# Patient Record
Sex: FEMALE | Race: WHITE | NOT HISPANIC OR LATINO | Employment: FULL TIME | URBAN - NONMETROPOLITAN AREA
[De-identification: names, ages, dates, MRNs, and addresses within clinical notes are randomized per-mention and may not be internally consistent; named-entity substitution may affect disease eponyms.]

---

## 2024-06-05 ENCOUNTER — HOSPITAL ENCOUNTER (EMERGENCY)
Facility: HOSPITAL | Age: 29
Discharge: HOME | End: 2024-06-05
Attending: EMERGENCY MEDICINE
Payer: COMMERCIAL

## 2024-06-05 ENCOUNTER — HOSPITAL ENCOUNTER (OUTPATIENT)
Dept: CARDIOLOGY | Facility: HOSPITAL | Age: 29
Discharge: HOME | End: 2024-06-05
Payer: COMMERCIAL

## 2024-06-05 ENCOUNTER — APPOINTMENT (OUTPATIENT)
Dept: RADIOLOGY | Facility: HOSPITAL | Age: 29
End: 2024-06-05
Payer: COMMERCIAL

## 2024-06-05 VITALS
OXYGEN SATURATION: 98 % | RESPIRATION RATE: 15 BRPM | BODY MASS INDEX: 22.58 KG/M2 | DIASTOLIC BLOOD PRESSURE: 78 MMHG | WEIGHT: 115 LBS | HEART RATE: 85 BPM | HEIGHT: 60 IN | TEMPERATURE: 98.3 F | SYSTOLIC BLOOD PRESSURE: 127 MMHG

## 2024-06-05 DIAGNOSIS — R07.9 CHEST PAIN, UNSPECIFIED TYPE: Primary | ICD-10-CM

## 2024-06-05 DIAGNOSIS — R06.00 DYSPNEA, UNSPECIFIED TYPE: ICD-10-CM

## 2024-06-05 LAB
ALBUMIN SERPL BCP-MCNC: 4.9 G/DL (ref 3.4–5)
ALP SERPL-CCNC: 56 U/L (ref 33–110)
ALT SERPL W P-5'-P-CCNC: 9 U/L (ref 7–45)
ANION GAP SERPL CALC-SCNC: 12 MMOL/L (ref 10–20)
AST SERPL W P-5'-P-CCNC: 12 U/L (ref 9–39)
ATRIAL RATE: 90 BPM
BASOPHILS # BLD AUTO: 0.02 X10*3/UL (ref 0–0.1)
BASOPHILS NFR BLD AUTO: 0.4 %
BILIRUB SERPL-MCNC: 0.5 MG/DL (ref 0–1.2)
BUN SERPL-MCNC: 14 MG/DL (ref 6–23)
CALCIUM SERPL-MCNC: 9.7 MG/DL (ref 8.6–10.3)
CARDIAC TROPONIN I PNL SERPL HS: <3 NG/L (ref 0–13)
CARDIAC TROPONIN I PNL SERPL HS: <3 NG/L (ref 0–13)
CHLORIDE SERPL-SCNC: 106 MMOL/L (ref 98–107)
CO2 SERPL-SCNC: 28 MMOL/L (ref 21–32)
CREAT SERPL-MCNC: 0.63 MG/DL (ref 0.5–1.05)
D DIMER PPP FEU-MCNC: <215 NG/ML FEU
EGFRCR SERPLBLD CKD-EPI 2021: >90 ML/MIN/1.73M*2
EOSINOPHIL # BLD AUTO: 0.02 X10*3/UL (ref 0–0.7)
EOSINOPHIL NFR BLD AUTO: 0.4 %
ERYTHROCYTE [DISTWIDTH] IN BLOOD BY AUTOMATED COUNT: 11.8 % (ref 11.5–14.5)
GLUCOSE SERPL-MCNC: 88 MG/DL (ref 74–99)
HCT VFR BLD AUTO: 40.9 % (ref 36–46)
HGB BLD-MCNC: 13.3 G/DL (ref 12–16)
IMM GRANULOCYTES # BLD AUTO: 0.02 X10*3/UL (ref 0–0.7)
IMM GRANULOCYTES NFR BLD AUTO: 0.4 % (ref 0–0.9)
LYMPHOCYTES # BLD AUTO: 0.88 X10*3/UL (ref 1.2–4.8)
LYMPHOCYTES NFR BLD AUTO: 15.9 %
MCH RBC QN AUTO: 30.7 PG (ref 26–34)
MCHC RBC AUTO-ENTMCNC: 32.5 G/DL (ref 32–36)
MCV RBC AUTO: 95 FL (ref 80–100)
MONOCYTES # BLD AUTO: 0.57 X10*3/UL (ref 0.1–1)
MONOCYTES NFR BLD AUTO: 10.3 %
NEUTROPHILS # BLD AUTO: 4.03 X10*3/UL (ref 1.2–7.7)
NEUTROPHILS NFR BLD AUTO: 72.6 %
NRBC BLD-RTO: 0 /100 WBCS (ref 0–0)
P AXIS: 64 DEGREES
P OFFSET: 207 MS
P ONSET: 158 MS
PLATELET # BLD AUTO: 162 X10*3/UL (ref 150–450)
POTASSIUM SERPL-SCNC: 3.9 MMOL/L (ref 3.5–5.3)
PR INTERVAL: 126 MS
PROT SERPL-MCNC: 7.9 G/DL (ref 6.4–8.2)
Q ONSET: 221 MS
QRS COUNT: 15 BEATS
QRS DURATION: 76 MS
QT INTERVAL: 342 MS
QTC CALCULATION(BAZETT): 418 MS
QTC FREDERICIA: 391 MS
R AXIS: 68 DEGREES
RBC # BLD AUTO: 4.33 X10*6/UL (ref 4–5.2)
SODIUM SERPL-SCNC: 142 MMOL/L (ref 136–145)
T AXIS: 6 DEGREES
T OFFSET: 392 MS
VENTRICULAR RATE: 90 BPM
WBC # BLD AUTO: 5.5 X10*3/UL (ref 4.4–11.3)

## 2024-06-05 PROCEDURE — 84484 ASSAY OF TROPONIN QUANT: CPT

## 2024-06-05 PROCEDURE — 36415 COLL VENOUS BLD VENIPUNCTURE: CPT

## 2024-06-05 PROCEDURE — 99283 EMERGENCY DEPT VISIT LOW MDM: CPT | Mod: 25

## 2024-06-05 PROCEDURE — 71046 X-RAY EXAM CHEST 2 VIEWS: CPT | Performed by: RADIOLOGY

## 2024-06-05 PROCEDURE — 93005 ELECTROCARDIOGRAM TRACING: CPT

## 2024-06-05 PROCEDURE — 85025 COMPLETE CBC W/AUTO DIFF WBC: CPT

## 2024-06-05 PROCEDURE — 85379 FIBRIN DEGRADATION QUANT: CPT

## 2024-06-05 PROCEDURE — 84075 ASSAY ALKALINE PHOSPHATASE: CPT

## 2024-06-05 PROCEDURE — 71046 X-RAY EXAM CHEST 2 VIEWS: CPT

## 2024-06-05 ASSESSMENT — PAIN DESCRIPTION - ORIENTATION: ORIENTATION: RIGHT

## 2024-06-05 ASSESSMENT — HEART SCORE
TROPONIN: LESS THAN OR EQUAL TO NORMAL LIMIT
ECG: NORMAL
RISK FACTORS: NO KNOWN RISK FACTORS
HISTORY: SLIGHTLY SUSPICIOUS
AGE: <45
HEART SCORE: 0

## 2024-06-05 ASSESSMENT — PAIN SCALES - GENERAL
PAINLEVEL_OUTOF10: 2
PAINLEVEL_OUTOF10: 7

## 2024-06-05 ASSESSMENT — PAIN DESCRIPTION - DESCRIPTORS
DESCRIPTORS: ACHING
DESCRIPTORS: ACHING

## 2024-06-05 ASSESSMENT — COLUMBIA-SUICIDE SEVERITY RATING SCALE - C-SSRS
2. HAVE YOU ACTUALLY HAD ANY THOUGHTS OF KILLING YOURSELF?: NO
1. IN THE PAST MONTH, HAVE YOU WISHED YOU WERE DEAD OR WISHED YOU COULD GO TO SLEEP AND NOT WAKE UP?: NO

## 2024-06-05 ASSESSMENT — PAIN - FUNCTIONAL ASSESSMENT
PAIN_FUNCTIONAL_ASSESSMENT: 0-10
PAIN_FUNCTIONAL_ASSESSMENT: 0-10

## 2024-06-05 ASSESSMENT — PAIN DESCRIPTION - LOCATION: LOCATION: CHEST

## 2024-06-05 NOTE — ED NOTES
Patient to ed after she has had intermittent right sided chest pain and palpitations for aprox 3 days. Patient is on a low dose beta blocker for tachycardia. Patient called cardiology and the office was unable to get her in to be seen and recommended she go to the ed. Patient brought into triage room and EKG done ASAP.     Megan Tavarez RN  06/05/24 0417

## 2024-06-05 NOTE — ED PROVIDER NOTES
Limitations to history: None  Independent Historians: Family  External Records Reviewed: HIE, OARRS, outpatient notes, inpatient notes, paper charts if needed    History of Present Illness:  Patient is a 28-year-old female presents to ED chief complaint of chest pain, palpitations and shortness of breath for the past 3 to 4 days.  Patient also reports some left-sided rib pain.  Reports that her shortness of breath is worse upon inspiration, reports that the chest pain is 6 out of 10 and is intermittent.  Patient denies any recent falls, injury and/or trauma.  Patient denies any fevers, chills, smoking history, recent travel.  Patient reports she does not take birth control.  Reports that she has a past medical history of tachycardia, and she takes metoprolol.  Reports that she called her cardiology office and was unable to be seen today.  Patient is alert and oriented x 3 upon examination and otherwise no apparent distress.      Denies HA, ABD pain, Nausea, Vomiting, Diarrhea, Weakness, Dizziness, Fever, Chills.    PMFSH:   As per HPI, otherwise nurses notes reviewed in EMR    Physical Exam:  Appearance: Alert, oriented x3, supine on exam table with head elevated, cooperative, in no acute distress. Well nourished & well hydrated.      Skin: Intact, dry skin, no lesions, rash, petechiae or purpura.     Eyes: PERRLA, EOMs intact, Conjunctiva pink with no redness or exudates. No scleral icterus.     Ears: Hearing grossly intact.      Nose: Nares patent, no epistaxis.     Mouth: Dentition without concerning abnormalities. no obstruction of posterior pharynx.     Neck: Supple, without meningismus. Trachea at midline.     Pulmonary: Clear bilaterally with good chest wall excursion. No rales, rhonchi or wheezing. No accessory muscle use or stridor. Talking in full sentences.     Cardiac: Normal S1, S2 without murmur, rub, gallop or extrasystole.     Abdomen: Soft, nontender to light and deep palpation to all quadrants,  normoactive bowel sounds.  No palpable organomegaly.  No rebound or guarding.     Genitourinary: Physical exam deferred.     Musculoskeletal: Normal gait. Full range of motion to all extremities. Rest of the exam reveals no pain on palpation, instability, or deformity. Pulses full and equal. No cyanosis or clubbing. capillary refill <2 seconds to all examined digits.     Neurological:  Cranial nerves II through XII are grossly intact, normal sensation, no weakness, no focal findings identified.      Psychiatric: Appropriate mood and affect.    EKG interpreted by me shows   Ventricular rate of 90  bpm  MN interval   126             ms  QTc    342/418                        ms  No T wave elevation or depression    Labs Reviewed   CBC WITH AUTO DIFFERENTIAL - Abnormal       Result Value    WBC 5.5      nRBC 0.0      RBC 4.33      Hemoglobin 13.3      Hematocrit 40.9      MCV 95      MCH 30.7      MCHC 32.5      RDW 11.8      Platelets 162      Neutrophils % 72.6      Immature Granulocytes %, Automated 0.4      Lymphocytes % 15.9      Monocytes % 10.3      Eosinophils % 0.4      Basophils % 0.4      Neutrophils Absolute 4.03      Immature Granulocytes Absolute, Automated 0.02      Lymphocytes Absolute 0.88 (*)     Monocytes Absolute 0.57      Eosinophils Absolute 0.02      Basophils Absolute 0.02     COMPREHENSIVE METABOLIC PANEL - Normal    Glucose 88      Sodium 142      Potassium 3.9      Chloride 106      Bicarbonate 28      Anion Gap 12      Urea Nitrogen 14      Creatinine 0.63      eGFR >90      Calcium 9.7      Albumin 4.9      Alkaline Phosphatase 56      Total Protein 7.9      AST 12      Bilirubin, Total 0.5      ALT 9     D-DIMER, NON VTE - Normal    D-Dimer Non VTE, Quant (ng/mL FEU) <215      Narrative:     The D-Dimer assay is reported in ng/mL Fibrinogen Equivalent Units (FEU). The results of this assay should NOT be used for the exclusion of Deep Vein Thrombosis and/or Pulmonary Embolism.   SERIAL  TROPONIN-INITIAL - Normal    Troponin I, High Sensitivity <3      Narrative:     Less than 99th percentile of normal range cutoff-  Female and children under 18 years old <14 ng/L; Male <21 ng/L: Negative  Repeat testing should be performed if clinically indicated.     Female and children under 18 years old 14-50 ng/L; Male 21-50 ng/L:  Consistent with possible cardiac damage and possible increased clinical   risk. Serial measurements may help to assess extent of myocardial damage.     >50 ng/L: Consistent with cardiac damage, increased clinical risk and  myocardial infarction. Serial measurements may help assess extent of   myocardial damage.      NOTE: Children less than 1 year old may have higher baseline troponin   levels and results should be interpreted in conjunction with the overall   clinical context.     NOTE: Troponin I testing is performed using a different   testing methodology at [facility] than at other   [facility]. Direct result comparisons should only   be made within the same method.   SERIAL TROPONIN, 1 HOUR - Normal    Troponin I, High Sensitivity <3      Narrative:     Less than 99th percentile of normal range cutoff-  Female and children under 18 years old <14 ng/L; Male <21 ng/L: Negative  Repeat testing should be performed if clinically indicated.     Female and children under 18 years old 14-50 ng/L; Male 21-50 ng/L:  Consistent with possible cardiac damage and possible increased clinical   risk. Serial measurements may help to assess extent of myocardial damage.     >50 ng/L: Consistent with cardiac damage, increased clinical risk and  myocardial infarction. Serial measurements may help assess extent of   myocardial damage.      NOTE: Children less than 1 year old may have higher baseline troponin   levels and results should be interpreted in conjunction with the overall   clinical context.     NOTE: Troponin I testing is performed using a different   testing  methodology at [facility] [place] [facility] than at other   [facility]. Direct result comparisons should only   be made within the same method.   TROPONIN SERIES- (INITIAL, 1 HR)    Narrative:     The following orders were created for panel order Troponin Series, (0, 1 HR).  Procedure                               Abnormality         Status                     ---------                               -----------         ------                     Troponin I, High Sensiti...[[id number]]  Normal              Final result               Troponin, High Sensitivi...[[id number]]  Normal              Final result                 Please view results for these tests on the individual orders.      XR chest 2 views   Final Result   No acute pathologic findings are identified on this exam.        MACRO:   none        Signed by: David Lim 6/5/2024 12:07 PM   Dictation workstation:   [id number]             Repeat Evaluation below    Summary:  Medical Decision Making:   Patient presented as described in HPI. Patient case including ROS, PE, and treatment and plan discussed with ED attending if attached as cosigner. Due to patients presentation orders completed include as documented.  Patient evaluated for complaints of chest pain, shortness of breath.  Patient was found to be afebrile, nontachycardic, nonhypoxic.  Lab work was unremarkable.  No evidence of leukocytosis or electrolyte imbalances present.  Delta troponin was negative, D-dimer was also negative.  Patient had a total heart score of 0.  Chest x-ray revealed no acute pathological findings.  Patient remained asymptomatic while in ED, well-appearing.  Patient reports she has a follow-up appointment scheduled with Dr. Cloud on next Wednesday.  Patient is aware to keep her appointment and follow-up.  Patient is aware that if her chest pain worsens, shortness of breath becomes constant she needs to return to ED for further evaluation and treatment, otherwise follow-up  as previously scheduled.  Patient was advised to follow up with PCP or recommended provider in 2-3 days for another evaluation and exam. I advised patient/guardian to return or go to closest emergency room immediately if symptoms change, get worse, new symptoms develop prior to follow up. If there is no improvement in symptoms in the next 24 hours they are advised to return for further evaluation and exam. I also explained the plan and treatment course. Patient/guardian is in agreement with plan, treatment course, and follow up and states verbally that they will comply.    Tests/Medications/Escalations of Care considered but not given:    Patient care discussed with: N/A  Social Determinants affecting care: N/A    Final diagnosis and disposition as documented in impression    Homegoing. I discussed the differential; results and discharge plan with the patient and/or family/friend/caregiver if present.  I emphasized the importance of follow-up with the physician I referred them to in the timeframe recommended.  I explained reasons for the patient to return to the Emergency Department. They agreed that if they feel their condition is worsening or if they have any other concern they should call 911 immediately for further assistance. I gave the patient an opportunity to ask all questions they had and answered all of them accordingly. They understand return precautions and discharge instructions. The patient and/or family/friend/caregiver expressed understanding verbally and that they would comply.       Disposition:  Discharge         This note has been transcribed using voice recognition and may contain grammatical errors, misplaced words, incorrect words, incorrect phrases or other errors.     Renata Larry, MARIA ESTHER-CNP  06/05/24 [id number]

## 2024-06-14 LAB
ATRIAL RATE: 90 BPM
P AXIS: 64 DEGREES
P OFFSET: 207 MS
P ONSET: 158 MS
PR INTERVAL: 126 MS
Q ONSET: 221 MS
QRS COUNT: 15 BEATS
QRS DURATION: 76 MS
QT INTERVAL: 342 MS
QTC CALCULATION(BAZETT): 418 MS
QTC FREDERICIA: 391 MS
R AXIS: 68 DEGREES
T AXIS: 6 DEGREES
T OFFSET: 392 MS
VENTRICULAR RATE: 90 BPM

## 2024-09-17 ENCOUNTER — OFFICE VISIT (OUTPATIENT)
Dept: URGENT CARE | Age: 29
End: 2024-09-17
Payer: COMMERCIAL

## 2024-09-17 ENCOUNTER — CLINICAL SUPPORT (OUTPATIENT)
Dept: URGENT CARE | Age: 29
End: 2024-09-17
Payer: COMMERCIAL

## 2024-09-17 VITALS
SYSTOLIC BLOOD PRESSURE: 126 MMHG | TEMPERATURE: 97.8 F | OXYGEN SATURATION: 100 % | HEART RATE: 85 BPM | BODY MASS INDEX: 23.56 KG/M2 | WEIGHT: 120 LBS | RESPIRATION RATE: 12 BRPM | DIASTOLIC BLOOD PRESSURE: 88 MMHG | HEIGHT: 60 IN

## 2024-09-17 DIAGNOSIS — J06.9 URI, ACUTE: ICD-10-CM

## 2024-09-17 DIAGNOSIS — R05.1 ACUTE COUGH: Primary | ICD-10-CM

## 2024-09-17 PROCEDURE — 71046 X-RAY EXAM CHEST 2 VIEWS: CPT

## 2024-09-17 RX ORDER — METOPROLOL SUCCINATE 25 MG/1
25 TABLET, EXTENDED RELEASE ORAL DAILY
COMMUNITY

## 2024-09-17 RX ORDER — DOXYCYCLINE 100 MG/1
100 CAPSULE ORAL 2 TIMES DAILY
Qty: 20 CAPSULE | Refills: 0 | Status: SHIPPED | OUTPATIENT
Start: 2024-09-17 | End: 2024-09-27

## 2024-09-17 RX ORDER — PREDNISONE 50 MG/1
50 TABLET ORAL DAILY
Qty: 5 TABLET | Refills: 0 | Status: SHIPPED | OUTPATIENT
Start: 2024-09-17 | End: 2024-09-22

## 2024-09-17 NOTE — LETTER
September 17, 2024     Patient: Juani Haque   YOB: 1995   Date of Visit: 9/17/2024       To Whom It May Concern:    Juani Haque was seen in my clinic on 9/17/2024 at 11:15 am. Please excuse Juani for her absence from work on this day to make the appointment. She can return to work 9/18/24.    If you have any questions or concerns, please don't hesitate to call.         Sincerely,         Roxanna Lee PA-C        CC: No Recipients

## 2024-09-17 NOTE — PROGRESS NOTES
Subjective   Patient ID: Juani Haque is a 29 y.o. female. They present today with a chief complaint of Cough and Nasal Congestion (Patient stated she has had a cough and congestion x4 weeks).    History of Present Illness  29-year-old female presents to the urgent care for complaint of cough and nasal congestion that she has had for approximately 4 weeks.  Patient states she has not improved and is status and for approximate 4 weeks.  Denies any current fevers chills, nausea vomiting sweats, chest pain or shortness breath, abdominal pain, sore throat, sinus pressure, ear pain.  Patient states she did have childhood asthma that she grew out of.  Patient denies any smoking history or current asthma or COPD.  Patient dates she does take metoprolol for palpitations which does help.  Patient denies any other allergies other than latex.  Chest x-ray negative.  Prescribed doxycycline and prednisone.  Patient instructed to follow-up with primary care provider in 1 to 2 weeks.  Return/ER precautions.  Patient agrees with plan.          Past Medical History  Allergies as of 09/17/2024 - Reviewed 09/17/2024   Allergen Reaction Noted    Latex Hives 06/05/2024       (Not in a hospital admission)       Past Medical History:   Diagnosis Date    Other conditions influencing health status     No significant past medical history    Personal history of other diseases of the circulatory system 03/03/2016    History of sinus tachycardia    Strain of muscle, fascia and tendon at neck level, subsequent encounter 11/17/2016    Strain of neck muscle, subsequent encounter       Past Surgical History:   Procedure Laterality Date    MR ARTHROGRAM SHOULDER RIGHT W FL GUIDED INJECTION Right 4/4/2013    MR SHOULDER ARTHROGRAM RIGHT W FL GUIDED INJECTION [facility]        reports that she has never smoked. She has never used smokeless tobacco. She reports that she does not currently use alcohol. She reports that she does not use  drugs.    Review of Systems  Review of Systems   All other systems reviewed and are negative.                                 Objective    Vitals:    09/17/24 1137   BP: 126/88   BP Location: Left arm   Patient Position: Sitting   BP Cuff Size: Adult   Pulse: 85   Resp: 12   Temp: 36.6 °C (97.8 °F)   TempSrc: Oral   SpO2: 100%   Weight: 54.4 kg (120 lb)   Height: 1.524 m (5')     Patient's last menstrual period was 09/10/2024 (approximate).    Physical Exam  Vitals reviewed.   Constitutional:       Appearance: Normal appearance.   HENT:      Head: Normocephalic and atraumatic.      Comments: No sinus tenderness     Nose: Congestion present. No rhinorrhea.      Mouth/Throat:      Mouth: Mucous membranes are moist.      Pharynx: Oropharynx is clear.      Comments: Uvula midline and normal.  Tonsils unremarkable.  Pharynx clear.  Cardiovascular:      Rate and Rhythm: Normal rate and regular rhythm.   Pulmonary:      Effort: Pulmonary effort is normal.      Breath sounds: Normal breath sounds.   Abdominal:      General: Abdomen is flat.      Palpations: Abdomen is soft.   Musculoskeletal:      Cervical back: Normal range of motion and neck supple. No tenderness.   Lymphadenopathy:      Cervical: No cervical adenopathy.   Neurological:      General: No focal deficit present.      Mental Status: She is alert and oriented to person, place, and time.   Psychiatric:         Mood and Affect: Mood normal.         Behavior: Behavior normal.         Procedures    Point of Care Test & Imaging Results from this visit  No results found for this visit on 09/17/24.   No results found.    Diagnostic study results (if any) were reviewed by Roxanna Lee PA-C.    Assessment/Plan   Allergies, medications, history, and pertinent labs/EKGs/Imaging reviewed by Roxanna Lee PA-C.     Medical Decision Making  29-year-old female presents to the urgent care for complaint of cough and nasal congestion that she has had for  approximately 4 weeks.  Patient states she has not improved and is status and for approximate 4 weeks.  Denies any current fevers chills, nausea vomiting sweats, chest pain or shortness breath, abdominal pain, sore throat, sinus pressure, ear pain.  Patient states she did have childhood asthma that she grew out of.  Patient denies any smoking history or current asthma or COPD.  Patient dates she does take metoprolol for palpitations which does help.  Patient denies any other allergies other than latex.  Chest x-ray negative.  Prescribed doxycycline and prednisone.  Patient instructed to follow-up with primary care provider in 1 to 2 weeks.  Return/ER precautions.  Patient agrees with plan.    Orders and Diagnoses  There are no diagnoses linked to this encounter.    Medical Admin Record      Follow Up Instructions  No follow-ups on file.    Patient disposition: Home    Electronically signed by Roxanna Lee PA-C  11:42 AM

## 2024-09-17 NOTE — LETTER
September 17, 2024     Patient: Juani Haque   YOB: 1995   Date of Visit: 9/17/2024       To Whom It May Concern:    Juani Haque was seen in my clinic on 9/17/2024 at 11:15 am. Please excuse Juani for her absence from work on this day to make the appointment.    If you have any questions or concerns, please don't hesitate to call.         Sincerely,         Roxanna Lee PA-C        CC: No Recipients

## 2025-04-10 ENCOUNTER — APPOINTMENT (OUTPATIENT)
Dept: CARDIOLOGY | Facility: HOSPITAL | Age: 30
End: 2025-04-10
Payer: COMMERCIAL

## 2025-04-10 ENCOUNTER — HOSPITAL ENCOUNTER (EMERGENCY)
Facility: HOSPITAL | Age: 30
Discharge: HOME | End: 2025-04-10
Attending: EMERGENCY MEDICINE
Payer: COMMERCIAL

## 2025-04-10 ENCOUNTER — OFFICE VISIT (OUTPATIENT)
Dept: URGENT CARE | Age: 30
End: 2025-04-10
Payer: COMMERCIAL

## 2025-04-10 VITALS
OXYGEN SATURATION: 99 % | HEART RATE: 68 BPM | HEIGHT: 60 IN | BODY MASS INDEX: 23.56 KG/M2 | SYSTOLIC BLOOD PRESSURE: 109 MMHG | DIASTOLIC BLOOD PRESSURE: 75 MMHG | WEIGHT: 120 LBS | RESPIRATION RATE: 16 BRPM | TEMPERATURE: 98.7 F

## 2025-04-10 VITALS
WEIGHT: 125.44 LBS | RESPIRATION RATE: 18 BRPM | BODY MASS INDEX: 24.63 KG/M2 | HEART RATE: 91 BPM | TEMPERATURE: 98.1 F | SYSTOLIC BLOOD PRESSURE: 126 MMHG | DIASTOLIC BLOOD PRESSURE: 84 MMHG | OXYGEN SATURATION: 100 % | HEIGHT: 60 IN

## 2025-04-10 DIAGNOSIS — H53.8 BLURRY VISION: ICD-10-CM

## 2025-04-10 DIAGNOSIS — R42 LIGHTHEADEDNESS: ICD-10-CM

## 2025-04-10 DIAGNOSIS — R55 NEAR SYNCOPE: Primary | ICD-10-CM

## 2025-04-10 DIAGNOSIS — R55 PRE-SYNCOPE: Primary | ICD-10-CM

## 2025-04-10 DIAGNOSIS — R00.2 PALPITATIONS: ICD-10-CM

## 2025-04-10 DIAGNOSIS — H53.8 BLURRED VISION: ICD-10-CM

## 2025-04-10 LAB
ALBUMIN SERPL BCP-MCNC: 4.7 G/DL (ref 3.4–5)
ALP SERPL-CCNC: 53 U/L (ref 33–110)
ALT SERPL W P-5'-P-CCNC: 10 U/L (ref 7–45)
ANION GAP SERPL CALC-SCNC: 11 MMOL/L (ref 10–20)
APPEARANCE UR: ABNORMAL
AST SERPL W P-5'-P-CCNC: 11 U/L (ref 9–39)
ATRIAL RATE: 85 BPM
BASOPHILS # BLD AUTO: 0.02 X10*3/UL (ref 0–0.1)
BASOPHILS NFR BLD AUTO: 0.4 %
BILIRUB SERPL-MCNC: 0.4 MG/DL (ref 0–1.2)
BILIRUB UR STRIP.AUTO-MCNC: NEGATIVE MG/DL
BUN SERPL-MCNC: 11 MG/DL (ref 6–23)
CALCIUM SERPL-MCNC: 9.6 MG/DL (ref 8.6–10.3)
CARDIAC TROPONIN I PNL SERPL HS: <3 NG/L (ref 0–13)
CARDIAC TROPONIN I PNL SERPL HS: <3 NG/L (ref 0–13)
CHLORIDE SERPL-SCNC: 106 MMOL/L (ref 98–107)
CO2 SERPL-SCNC: 30 MMOL/L (ref 21–32)
COLOR UR: COLORLESS
CREAT SERPL-MCNC: 0.54 MG/DL (ref 0.5–1.05)
D DIMER PPP FEU-MCNC: 242 NG/ML FEU
EGFRCR SERPLBLD CKD-EPI 2021: >90 ML/MIN/1.73M*2
EOSINOPHIL # BLD AUTO: 0.02 X10*3/UL (ref 0–0.7)
EOSINOPHIL NFR BLD AUTO: 0.4 %
ERYTHROCYTE [DISTWIDTH] IN BLOOD BY AUTOMATED COUNT: 11.6 % (ref 11.5–14.5)
GLUCOSE SERPL-MCNC: 93 MG/DL (ref 74–99)
GLUCOSE UR STRIP.AUTO-MCNC: NORMAL MG/DL
HCG UR QL IA.RAPID: NEGATIVE
HCT VFR BLD AUTO: 39.9 % (ref 36–46)
HGB BLD-MCNC: 13 G/DL (ref 12–16)
HOLD SPECIMEN: NORMAL
IMM GRANULOCYTES # BLD AUTO: 0.02 X10*3/UL (ref 0–0.7)
IMM GRANULOCYTES NFR BLD AUTO: 0.4 % (ref 0–0.9)
KETONES UR STRIP.AUTO-MCNC: NEGATIVE MG/DL
LEUKOCYTE ESTERASE UR QL STRIP.AUTO: ABNORMAL
LYMPHOCYTES # BLD AUTO: 1.37 X10*3/UL (ref 1.2–4.8)
LYMPHOCYTES NFR BLD AUTO: 28.3 %
MAGNESIUM SERPL-MCNC: 2.06 MG/DL (ref 1.6–2.4)
MCH RBC QN AUTO: 30.3 PG (ref 26–34)
MCHC RBC AUTO-ENTMCNC: 32.6 G/DL (ref 32–36)
MCV RBC AUTO: 93 FL (ref 80–100)
MONOCYTES # BLD AUTO: 0.32 X10*3/UL (ref 0.1–1)
MONOCYTES NFR BLD AUTO: 6.6 %
NEUTROPHILS # BLD AUTO: 3.09 X10*3/UL (ref 1.2–7.7)
NEUTROPHILS NFR BLD AUTO: 63.9 %
NITRITE UR QL STRIP.AUTO: NEGATIVE
NRBC BLD-RTO: 0 /100 WBCS (ref 0–0)
P AXIS: 53 DEGREES
P OFFSET: 205 MS
P ONSET: 160 MS
PH UR STRIP.AUTO: 8 [PH]
PLATELET # BLD AUTO: 182 X10*3/UL (ref 150–450)
POC FINGERSTICK BLOOD GLUCOSE: 101 MG/DL (ref 70–100)
POTASSIUM SERPL-SCNC: 4.5 MMOL/L (ref 3.5–5.3)
PR INTERVAL: 126 MS
PREGNANCY TEST URINE, POC: NEGATIVE
PROT SERPL-MCNC: 7.9 G/DL (ref 6.4–8.2)
PROT UR STRIP.AUTO-MCNC: NEGATIVE MG/DL
Q ONSET: 223 MS
QRS COUNT: 14 BEATS
QRS DURATION: 64 MS
QT INTERVAL: 352 MS
QTC CALCULATION(BAZETT): 418 MS
QTC FREDERICIA: 395 MS
R AXIS: 63 DEGREES
RBC # BLD AUTO: 4.29 X10*6/UL (ref 4–5.2)
RBC # UR STRIP.AUTO: ABNORMAL MG/DL
RBC #/AREA URNS AUTO: ABNORMAL /HPF
SODIUM SERPL-SCNC: 142 MMOL/L (ref 136–145)
SP GR UR STRIP.AUTO: 1.01
SQUAMOUS #/AREA URNS AUTO: ABNORMAL /HPF
T AXIS: 40 DEGREES
T OFFSET: 399 MS
UROBILINOGEN UR STRIP.AUTO-MCNC: NORMAL MG/DL
VENTRICULAR RATE: 85 BPM
WBC # BLD AUTO: 4.8 X10*3/UL (ref 4.4–11.3)
WBC #/AREA URNS AUTO: ABNORMAL /HPF

## 2025-04-10 PROCEDURE — 85025 COMPLETE CBC W/AUTO DIFF WBC: CPT | Performed by: EMERGENCY MEDICINE

## 2025-04-10 PROCEDURE — 80053 COMPREHEN METABOLIC PANEL: CPT | Performed by: EMERGENCY MEDICINE

## 2025-04-10 PROCEDURE — 81001 URINALYSIS AUTO W/SCOPE: CPT | Performed by: EMERGENCY MEDICINE

## 2025-04-10 PROCEDURE — 81025 URINE PREGNANCY TEST: CPT | Performed by: EMERGENCY MEDICINE

## 2025-04-10 PROCEDURE — 83735 ASSAY OF MAGNESIUM: CPT | Performed by: EMERGENCY MEDICINE

## 2025-04-10 PROCEDURE — 84484 ASSAY OF TROPONIN QUANT: CPT | Performed by: EMERGENCY MEDICINE

## 2025-04-10 PROCEDURE — 87077 CULTURE AEROBIC IDENTIFY: CPT | Mod: GENLAB | Performed by: EMERGENCY MEDICINE

## 2025-04-10 PROCEDURE — 36415 COLL VENOUS BLD VENIPUNCTURE: CPT | Performed by: EMERGENCY MEDICINE

## 2025-04-10 PROCEDURE — 85379 FIBRIN DEGRADATION QUANT: CPT | Performed by: EMERGENCY MEDICINE

## 2025-04-10 PROCEDURE — 99284 EMERGENCY DEPT VISIT MOD MDM: CPT | Performed by: EMERGENCY MEDICINE

## 2025-04-10 PROCEDURE — 93005 ELECTROCARDIOGRAM TRACING: CPT

## 2025-04-10 ASSESSMENT — VISUAL ACUITY
OD: 20
OS: 20
OS: 15
OU: 20
OD: 15
OU: 15

## 2025-04-10 ASSESSMENT — PATIENT HEALTH QUESTIONNAIRE - PHQ9
SUM OF ALL RESPONSES TO PHQ9 QUESTIONS 1 & 2: 0
2. FEELING DOWN, DEPRESSED OR HOPELESS: NOT AT ALL
1. LITTLE INTEREST OR PLEASURE IN DOING THINGS: NOT AT ALL

## 2025-04-10 ASSESSMENT — COLUMBIA-SUICIDE SEVERITY RATING SCALE - C-SSRS
6. HAVE YOU EVER DONE ANYTHING, STARTED TO DO ANYTHING, OR PREPARED TO DO ANYTHING TO END YOUR LIFE?: NO
1. IN THE PAST MONTH, HAVE YOU WISHED YOU WERE DEAD OR WISHED YOU COULD GO TO SLEEP AND NOT WAKE UP?: NO
2. HAVE YOU ACTUALLY HAD ANY THOUGHTS OF KILLING YOURSELF?: NO

## 2025-04-10 ASSESSMENT — PAIN SCALES - GENERAL
PAINLEVEL_OUTOF10: 0 - NO PAIN
PAINLEVEL_OUTOF10: 0 - NO PAIN

## 2025-04-10 ASSESSMENT — PAIN - FUNCTIONAL ASSESSMENT
PAIN_FUNCTIONAL_ASSESSMENT: 0-10
PAIN_FUNCTIONAL_ASSESSMENT: 0-10

## 2025-04-10 NOTE — PROGRESS NOTES
Subjective   Patient ID: Juani Haque is a 29 y.o. female. They present today with a chief complaint of No chief complaint on file..    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  States she had a similar episode like this last week that was also accompanied with some chest pain that lasted about 15 seconds.  Denies any current chest pain, palpitations, shortness of breath, abdominal pain, fevers, chills, nausea vomiting or sweats.  States she has some right ear fullness with some sinus pressure that she has had for about 1 week.  States she takes metoprolol for palpitations.  Denies recent long travel or surgery, hormone use, calf pain, leg swelling, hemoptysis, tobacco use, personal history or family history of blood clots.  EKG shows sinus tachycardia with heart rate 102 bpm,  ms, QRS 76 ms,  ms, QRS axis 71 degrees, no significant ST elevation or depression.  Fingerstick glucose 101.  Urine pregnancy negative.  Because of patient experiencing lightheadedness blurry vision and presyncope she was referred to emergency department for further evaluation to rule out other possible causes of her presyncope.  Patient was encouraged to take ambulance and patient declines this and signs refusal of ambulance.  States she is still agreeable to going directly to the emergency department and is having a family member drive her.  ER was contacted and given report.  I informed supervising physician Dr. Millard of patient going to the emergency department.          Past Medical History  Allergies as of 04/10/2025 - Reviewed 09/17/2024   Allergen Reaction Noted    Latex Hives 06/05/2024       (Not in a hospital admission)       Past Medical History:   Diagnosis Date    Other conditions influencing health status     No significant  past medical history    Personal history of other diseases of the circulatory system 03/03/2016    History of sinus tachycardia    Strain of muscle, fascia and tendon at neck level, subsequent encounter 11/17/2016    Strain of neck muscle, subsequent encounter       Past Surgical History:   Procedure Laterality Date    MR ARTHROGRAM SHOULDER RIGHT W FL GUIDED INJECTION Right 4/4/2013    MR SHOULDER ARTHROGRAM RIGHT W FL GUIDED INJECTION [facility]        reports that she has never smoked. She has never used smokeless tobacco. She reports that she does not currently use alcohol. She reports that she does not use drugs.    Review of Systems  Review of Systems   All other systems reviewed and are negative.                                 Objective    There were no vitals filed for this visit.  No LMP recorded.    Physical Exam  Vitals reviewed.   Constitutional:       General: She is not in acute distress.     Appearance: Normal appearance. She is not ill-appearing, toxic-appearing or diaphoretic.   HENT:      Head: Normocephalic and atraumatic.      Right Ear: Ear canal and external ear normal.      Left Ear: Tympanic membrane, ear canal and external ear normal.      Ears:      Comments: There is some fluid in the right TM no bulging or significant erythema.  No obvious TM perforation.     Mouth/Throat:      Mouth: Mucous membranes are moist.      Pharynx: Oropharynx is clear.      Comments: Pharynx mild erythematous without exudate, uvula midline normal, airway clear.  Neck:      Vascular: No carotid bruit.   Cardiovascular:      Rate and Rhythm: Normal rate and regular rhythm.   Pulmonary:      Effort: Pulmonary effort is normal. No respiratory distress.      Breath sounds: Normal breath sounds. No stridor. No wheezing, rhonchi or rales.   Abdominal:      General: Abdomen is flat.      Palpations: Abdomen is soft.      Tenderness: There is no abdominal tenderness. There is no right CVA tenderness or left  CVA tenderness.   Musculoskeletal:      Cervical back: Normal range of motion and neck supple. No rigidity or tenderness.   Lymphadenopathy:      Cervical: No cervical adenopathy.   Skin:     General: Skin is warm and dry.   Neurological:      General: No focal deficit present.      Mental Status: She is alert and oriented to person, place, and time.      Cranial Nerves: No cranial nerve deficit.      Sensory: No sensory deficit.      Motor: No weakness.      Coordination: Coordination normal.      Gait: Gait normal.   Psychiatric:         Mood and Affect: Mood normal.         Behavior: Behavior normal.         Procedures    Point of Care Test & Imaging Results from this visit  No results found for this visit on 04/10/25.   Imaging  No results found.    Cardiology, Vascular, and Other Imaging  No other imaging results found for the past 2 days      Diagnostic study results (if any) were reviewed by Roxanna Lee PA-C.    Assessment/Plan   Allergies, medications, history, and pertinent labs/EKGs/Imaging reviewed by Roxanna Lee PA-C.     Medical Decision Making  29-year-old female presents urgent care for lightheadedness, blurry vision and presyncope that occurred for about 3 minutes while she was driving states she had a pull over and be picked up by her coworker.  States she had a similar episode like this last week that was also accompanied with some chest pain that lasted about 15 seconds.  Denies any current chest pain, palpitations, shortness of breath, abdominal pain, fevers, chills, nausea vomiting or sweats.  States she has some right ear fullness with some sinus pressure that she has had for about 1 week.  States she takes metoprolol for palpitations.  Denies recent long travel or surgery, hormone use, calf pain, leg swelling, hemoptysis, tobacco use, personal history or family history of blood clots.  EKG shows sinus tachycardia with heart rate 102 bpm,  ms, QRS 76 ms,  ms, QRS  axis 71 degrees, no significant ST elevation or depression.  Fingerstick glucose 101.  Urine pregnancy negative.  Because of patient experiencing lightheadedness blurry vision and presyncope she was referred to emergency department for further evaluation to rule out other possible causes of her presyncope.  Patient was encouraged to take ambulance and patient declines this and signs refusal of ambulance.  States she is still agreeable to going directly to the emergency department and is having a family member drive her.  ER was contacted and given report.  I informed supervising physician Dr. Millard of patient going to the emergency department.    Orders and Diagnoses  Diagnoses and all orders for this visit:  Pre-syncope  -     ECG 12 Lead  -     POCT pregnancy, urine manually resulted  -     POCT fingerstick glucose manually resulted      Medical Admin Record      Patient disposition: ED    Electronically signed by Roxanna Lee PA-C  8:35 AM

## 2025-04-10 NOTE — Clinical Note
Juani Haque was seen and treated in our emergency department on 4/10/2025.  She may return to work on 04/11/2025.       If you have any questions or concerns, please don't hesitate to call.      Boo Chin, DO

## 2025-04-10 NOTE — ED TRIAGE NOTES
Pt arrives ambulatory to [facility] from  presenting with intermittent blurred vision that began at 0730 this AM. Pt states the episode lasted 2-3 minutes and has since improved. Pt states she was on her way to work when this occurred and noted the same symptom happened last week. Pt denies any significant medical history, states she is on metoprolol for an elevated HR. Pt denies any CP, SOB, N/V/D, fever and/or chills. Pt A&Ox3, resp eay and unlabored, skin of appropriate color. Initial NIH score 0.

## 2025-04-10 NOTE — ED PROVIDER NOTES
Department of Emergency Medicine   ED  Provider Note  Admit Date/RoomTime: 4/10/2025  9:56 AM  ED Room: AC03/AC03                  History of Present Illness:   Juani Haque is a 29 y.o. female presenting to the ED for palpitations, lightheaded, blurry vision, felt like she might pass out, beginning today while driving to work.  Episode lasted about 2 to 3 minutes.  She went to urgent care and she was referred here.  She did not pass out.  She felt like she might.  No nausea or vomiting.  No headache.  No numbness tingling or weakness of the upper or lower extremities.  The complaint has been  single event , moderate in severity, and worsened by nothing.  Patient reports a similar event last week that lasted about 3 to 4 seconds.  She has no weakness of the arms or legs.  No difficulty with speech.  No headache or confusion.      Review of Systems:   Pertinent positives and review of systems as noted above.  Remaining 10 review of systems is negative or noncontributory to today's episode of care.        --------------------------------------------- PAST HISTORY ---------------------------------------------  Past Medical History:  has a past medical history of Other conditions influencing health status, Personal history of other diseases of the circulatory system (03/03/2016), and Strain of muscle, fascia and tendon at neck level, subsequent encounter (11/17/2016).    Past Surgical History:  has a past surgical history that includes MR arthrogram shoulder right w FL guided injection (Right, 4/4/2013).    Social History:  reports that she has never smoked. She has never used smokeless tobacco. She reports that she does not currently use alcohol. She reports that she does not use drugs.    Family History: family history is not on file. Unless otherwise noted, family history is non contributory    Patient's Medications   New Prescriptions    No medications on file   Previous Medications    METOPROLOL SUCCINATE XL  (TOPROL-XL) 25 MG 24 HR TABLET    Take 25 mg by mouth once daily. Do not crush or chew.   Modified Medications    No medications on file   Discontinued Medications    No medications on file      The patient’s home medications have been reviewed.    Allergies: Latex    -------------------------------------------------- RESULTS -------------------------------------------------  All laboratory and radiology results have been personally reviewed by myself   LABS:  Labs Reviewed   URINALYSIS WITH REFLEX CULTURE AND MICROSCOPIC - Abnormal       Result Value    Color, Urine Colorless (*)     Appearance, Urine Turbid (*)     Specific Gravity, Urine 1.006      pH, Urine 8.0      Protein, Urine NEGATIVE      Glucose, Urine Normal      Blood, Urine 0.2 (2+) (*)     Ketones, Urine NEGATIVE      Bilirubin, Urine NEGATIVE      Urobilinogen, Urine Normal      Nitrite, Urine NEGATIVE      Leukocyte Esterase, Urine 500 Viviana/uL (*)    MICROSCOPIC ONLY, URINE - Abnormal    WBC, Urine 21-50 (*)     RBC, Urine 1-2      Squamous Epithelial Cells, Urine 1-9 (SPARSE)     MAGNESIUM - Normal    Magnesium 2.06     COMPREHENSIVE METABOLIC PANEL - Normal    Glucose 93      Sodium 142      Potassium 4.5      Chloride 106      Bicarbonate 30      Anion Gap 11      Urea Nitrogen 11      Creatinine 0.54      eGFR >90      Calcium 9.6      Albumin 4.7      Alkaline Phosphatase 53      Total Protein 7.9      AST 11      Bilirubin, Total 0.4      ALT 10     D-DIMER, VTE EXCLUSION - Normal    D-Dimer, Quantitative VTE Exclusion 242      Narrative:     The VTE Exclusion D-Dimer assay is reported in ng/mL Fibrinogen Equivalent Units (FEU).    Per 's instructions for use, a value of less than 500 ng/mL (FEU) may help to exclude DVT or PE in outpatients when the assay is used with a clinical pretest probability assessment.([facility] must utilize and document eCalc 'Wells Score Deep Vein Thrombosis Risk' for DVT exclusion only. Emergency Department  should utilize  Guidelines for Emergency Department Use of the VTE Exclusion D-Dimer and Clinical Pretest probability assessment model for DVT or PE exclusion.)   HCG, URINE, QUALITATIVE - Normal    HCG, Urine NEGATIVE     SERIAL TROPONIN-INITIAL - Normal    Troponin I, High Sensitivity <3      Narrative:     Less than 99th percentile of normal range cutoff-  Female and children under 18 years old <14 ng/L; Male <21 ng/L: Negative  Repeat testing should be performed if clinically indicated.     Female and children under 18 years old 14-50 ng/L; Male 21-50 ng/L:  Consistent with possible cardiac damage and possible increased clinical   risk. Serial measurements may help to assess extent of myocardial damage.     >50 ng/L: Consistent with cardiac damage, increased clinical risk and  myocardial infarction. Serial measurements may help assess extent of   myocardial damage.      NOTE: Children less than 1 year old may have higher baseline troponin   levels and results should be interpreted in conjunction with the overall   clinical context.     NOTE: Troponin I testing is performed using a different   testing methodology at [facility] than at other   [facility]. Direct result comparisons should only   be made within the same method.   SERIAL TROPONIN, 1 HOUR - Normal    Troponin I, High Sensitivity <3      Narrative:     Less than 99th percentile of normal range cutoff-  Female and children under 18 years old <14 ng/L; Male <21 ng/L: Negative  Repeat testing should be performed if clinically indicated.     Female and children under 18 years old 14-50 ng/L; Male 21-50 ng/L:  Consistent with possible cardiac damage and possible increased clinical   risk. Serial measurements may help to assess extent of myocardial damage.     >50 ng/L: Consistent with cardiac damage, increased clinical risk and  myocardial infarction. Serial measurements may help assess extent of   myocardial damage.      NOTE:  Children less than 1 year old may have higher baseline troponin   levels and results should be interpreted in conjunction with the overall   clinical context.     NOTE: Troponin I testing is performed using a different   testing methodology at [facility] than at other   [facility] hospitals. Direct result comparisons should only   be made within the same method.   URINE CULTURE   CBC WITH AUTO DIFFERENTIAL    WBC 4.8      nRBC 0.0      RBC 4.29      Hemoglobin 13.0      Hematocrit 39.9      MCV 93      MCH 30.3      MCHC 32.6      RDW 11.6      Platelets 182      Neutrophils % 63.9      Immature Granulocytes %, Automated 0.4      Lymphocytes % 28.3      Monocytes % 6.6      Eosinophils % 0.4      Basophils % 0.4      Neutrophils Absolute 3.09      Immature Granulocytes Absolute, Automated 0.02      Lymphocytes Absolute 1.37      Monocytes Absolute 0.32      Eosinophils Absolute 0.02      Basophils Absolute 0.02     TROPONIN SERIES- (INITIAL, 1 HR)    Narrative:     The following orders were created for panel order Troponin I Series, High Sensitivity (0, 1 HR).  Procedure                               Abnormality         Status                     ---------                               -----------         ------                     Troponin I, High Sensiti...[[id number]]  Normal              Final result               Troponin, High Sensitivi...[[id number]]  Normal              Final result                 Please view results for these tests on the individual orders.   URINALYSIS WITH REFLEX CULTURE AND MICROSCOPIC    Narrative:     The following orders were created for panel order Urinalysis with Reflex Culture and Microscopic.  Procedure                               Abnormality         Status                     ---------                               -----------         ------                     Urinalysis with Reflex C...[[id number]]  Abnormal            Final result               Extra Urine Arriaga  Tube[[id number]]                            In process                   Please view results for these tests on the individual orders.   EXTRA URINE GRAY TUBE         RADIOLOGY:  Interpreted by Radiologist.  No orders to display       Encounter Date: 04/10/25   ECG 12 lead   Result Value    Ventricular Rate 85    Atrial Rate 85    DE Interval 126    QRS Duration 64    QT Interval 352    QTC Calculation(Bazett) 418    P Axis 53    R Axis 63    T Axis 40    QRS Count 14    Q Onset 223    P Onset 160    P Offset 205    T Offset 399    QTC Fredericia 395    Narrative    Normal sinus rhythm with sinus arrhythmia  Nonspecific ST abnormality  Abnormal ECG  When compared with ECG of 05-JUN-2024 11:11,  No significant change was found     ------------------------- NURSING NOTES AND VITALS REVIEWED ---------------------------   The nursing notes within the ED encounter and vital signs as below have been reviewed.   /70   Pulse 93   Temp 36.8 °C (98.3 °F) (Temporal)   Resp 16   Ht 1.524 m (5')   Wt 54.4 kg (120 lb)   LMP 04/10/2025 (Exact Date)   SpO2 95%   BMI 23.44 kg/m²   Oxygen Saturation Interpretation: Normal      ---------------------------------------------------PHYSICAL EXAM--------------------------------------  Physical Exam  Vitals and nursing note reviewed.   Constitutional:       General: She is not in acute distress.     Appearance: She is well-developed. She is not ill-appearing or toxic-appearing.   HENT:      Head: Normocephalic and atraumatic.      Right Ear: Tympanic membrane, ear canal and external ear normal.      Left Ear: Tympanic membrane, ear canal and external ear normal.      Mouth/Throat:      Mouth: Mucous membranes are moist.      Pharynx: Oropharynx is clear. No oropharyngeal exudate or posterior oropharyngeal erythema.   Eyes:      General: No scleral icterus.     Extraocular Movements: Extraocular movements intact.      Conjunctiva/sclera: Conjunctivae normal.      Pupils: Pupils  are equal, round, and reactive to light.   Cardiovascular:      Rate and Rhythm: Normal rate and regular rhythm.      Pulses: Normal pulses.      Heart sounds: Normal heart sounds. No murmur heard.  Pulmonary:      Effort: Pulmonary effort is normal. No respiratory distress.      Breath sounds: Normal breath sounds. No wheezing, rhonchi or rales.   Abdominal:      General: There is no distension.      Palpations: Abdomen is soft.      Tenderness: There is no abdominal tenderness. There is no guarding or rebound.   Musculoskeletal:         General: No swelling, tenderness, deformity or signs of injury. Normal range of motion.      Cervical back: Normal range of motion and neck supple. No rigidity or tenderness.      Right lower leg: No edema.      Left lower leg: No edema.   Lymphadenopathy:      Cervical: No cervical adenopathy.   Skin:     General: Skin is warm and dry.      Capillary Refill: Capillary refill takes less than 2 seconds.      Coloration: Skin is not jaundiced or pale.      Findings: No bruising, erythema, lesion or rash.   Neurological:      Mental Status: She is alert and oriented to person, place, and time.      Cranial Nerves: No cranial nerve deficit.      Sensory: No sensory deficit.      Motor: No weakness.      Coordination: Coordination normal.      Gait: Gait normal.      Deep Tendon Reflexes: Reflexes normal.      Comments: Patient alert and oriented x 3.  Speech is normal.  No visual field deficit.  Finger-to-nose testing intact with good endpoints.  No heel drift.  Heel-to-shin intact.  Motor and sensory intact.  PERRLA, EOMI, no nystagmus.  Test of skew is normal.  NIH 0   Psychiatric:         Mood and Affect: Mood normal.            Procedures  NONE  ------------------------------ ED COURSE/MEDICAL DECISION MAKING----------------------    Medical Decision Making:   Patient was seen and examined by me.    ED Course as of 04/10/25 1259   Thu Apr 10, 2025   1017 An EKG at 10:02 AM  interpreted by me at 10:06 AM.  Normal sinus rhythm 85 bpm with sinus arrhythmia.  Normal axis.  MT, QRS, QT intervals are normal.  No acute ST-T change.  No STEMI. [EC]   1228 CBC is normal  Comprehensive metabolic panel is normal  Analysis is positive for leukocyte esterase, 21-50 white cells.  Minimal epithelial cells.    Urine pregnancy test is negative. [EC]   1229 D-dimer is negative at 242.  Troponin is normal at less than 3  Magnesium is normal  [EC]   1235 Orthostatic vital signs are negative.  Patient has no  symptoms of UTI. [EC]   1257 Second troponin is less than 3. [EC]   1257 The patient be discharged home in improved and stable condition.  The patient has normal neurologic exam.  There are no focal findings.  She had blurry vision with some palpitations and felt like she might pass out for approximately 2 to 3 minutes and this has resolved.  Orthostatic vital signs are negative.  Her exam is normal.  Lab work is normal. [EC]   1257 Patient does not have any symptoms of UTI.  I do not believe she has a urinary tract infection. [EC]   1257 The patient instructed to follow-up with her primary care.  To return if acutely worsening worrisome symptoms. [EC]      ED Course User Index  [EC] Boo Chin,          Diagnoses as of 04/10/25 1259   Near syncope   Blurry vision   Palpitations      Counseling:   The emergency provider has spoken with the patient and discussed today’s results, in addition to providing specific details for the plan of care and counseling regarding the diagnosis and prognosis.  Questions are answered at this time and they are agreeable with the plan.      --------------------------------- IMPRESSION AND DISPOSITION ---------------------------------        IMPRESSION  1. Near syncope    2. Blurry vision    3. Palpitations        DISPOSITION  Disposition: Discharge to home  Patient condition is fair      Billing Provider Critical Care Time: 0 minutes     Boo Chin,  DO  04/10/25 1258       Boo Chin, DO  04/10/25 1259

## 2025-04-10 NOTE — DISCHARGE INSTRUCTIONS
Continue all your regular medications.  Push clear fluids.  Change positions slowly.  Follow-up with your primary care in the next 3 to 5 days for recheck.  You are welcome to return with acutely worsening worrisome symptoms.  Lab work is normal.  EKG is normal.  Neurological exam is normal.

## 2025-04-12 LAB — BACTERIA UR CULT: ABNORMAL

## 2025-04-15 ENCOUNTER — TELEPHONE (OUTPATIENT)
Dept: PHARMACY | Facility: HOSPITAL | Age: 30
End: 2025-04-15
Payer: COMMERCIAL

## 2025-04-15 ENCOUNTER — OFFICE VISIT (OUTPATIENT)
Dept: PRIMARY CARE | Facility: CLINIC | Age: 30
End: 2025-04-15
Payer: COMMERCIAL

## 2025-04-15 VITALS
TEMPERATURE: 97.2 F | SYSTOLIC BLOOD PRESSURE: 120 MMHG | HEART RATE: 83 BPM | WEIGHT: 123 LBS | BODY MASS INDEX: 24.15 KG/M2 | HEIGHT: 60 IN | DIASTOLIC BLOOD PRESSURE: 70 MMHG | OXYGEN SATURATION: 98 % | RESPIRATION RATE: 20 BRPM

## 2025-04-15 DIAGNOSIS — R55 NEAR SYNCOPE: Primary | ICD-10-CM

## 2025-04-15 PROCEDURE — 99203 OFFICE O/P NEW LOW 30 MIN: CPT | Performed by: FAMILY MEDICINE

## 2025-04-15 PROCEDURE — 3008F BODY MASS INDEX DOCD: CPT | Performed by: FAMILY MEDICINE

## 2025-04-15 ASSESSMENT — PATIENT HEALTH QUESTIONNAIRE - PHQ9
SUM OF ALL RESPONSES TO PHQ9 QUESTIONS 1 AND 2: 0
2. FEELING DOWN, DEPRESSED OR HOPELESS: NOT AT ALL
1. LITTLE INTEREST OR PLEASURE IN DOING THINGS: NOT AT ALL

## 2025-04-15 ASSESSMENT — ENCOUNTER SYMPTOMS
DEPRESSION: 0
OCCASIONAL FEELINGS OF UNSTEADINESS: 0
LOSS OF SENSATION IN FEET: 0

## 2025-04-15 ASSESSMENT — PAIN SCALES - GENERAL: PAINLEVEL_OUTOF10: 0-NO PAIN

## 2025-04-15 NOTE — PROGRESS NOTES
Subjective   Patient ID: Juani Haque is a 29 y.o. female who presents for Follow-up (Pt here for ED follow up from [place] ) and Establish Care (Pt here to establish care).    HPI Pt went to ED for near syncope while driving to work.  She went to the  and was referred to ED for further eval.  Pt evaluated /labs drawn/orthostatic bps wnl.  Pt released.    Drinking fluids well and feeling mostly better.    Review of Systems see HPI    Objective   /70   Pulse 83   Temp 36.2 °C (97.2 °F) (Temporal)   Resp 20   Ht 1.524 m (5')   Wt 55.8 kg (123 lb)   LMP 04/10/2025 (Exact Date)   SpO2 98%   BMI 24.02 kg/m²     Physical Exam  Constitutional:       General: She is not in acute distress.     Appearance: Normal appearance.   Cardiovascular:      Rate and Rhythm: Normal rate and regular rhythm.      Heart sounds: No murmur heard.  Pulmonary:      Breath sounds: Normal breath sounds. No wheezing.   Neurological:      Mental Status: She is alert.         Assessment/Plan   Problem List Items Addressed This Visit    None  Visit Diagnoses         Codes      Near syncope    -  Primary R55        Continue good fluid intake/regular meals .  Follow up if any recurreence.

## 2025-04-15 NOTE — PROGRESS NOTES
EDPD Note: Rapid Result Review    I reviewed Juani Haque 's chart regarding a positive urine  culture/result that was taken during their recent emergency room visit. The patient was not told about these results prior to leaving the emergency department. Therefore, patient was contacted and given appropriate education.     Patient presented to the ED for near-syncope. Did not report urinary symptoms, not discharged on antibiotics. At time of call, patient denied urinary symptoms. Patient was asymptomatic in ED and still reports no urinary symptoms at the time of call. Since asymptomatic UTI, patient is not indicated for antibiotic at this time. Advised patient to follow up with PCP or urgent care if urinary symptoms due arise.     Susceptibility data from last 90 days.  Collected Specimen Info Organism   04/10/25 Urine from Clean Catch/Voided Streptococcus agalactiae (Group B Streptococcus)     Admission on 04/10/2025, Discharged on 04/10/2025   Component Date Value Ref Range Status    Ventricular Rate 04/10/2025 85  BPM Final    Atrial Rate 04/10/2025 85  BPM Final    MI Interval 04/10/2025 126  ms Final    QRS Duration 04/10/2025 64  ms Final    QT Interval 04/10/2025 352  ms Final    QTC Calculation(Bazett) 04/10/2025 418  ms Final    P Axis 04/10/2025 53  degrees Final    R Axis 04/10/2025 63  degrees Final    T Axis 04/10/2025 40  degrees Final    QRS Count 04/10/2025 14  beats Final    Q Onset 04/10/2025 223  ms Final    P Onset 04/10/2025 160  ms Final    P Offset 04/10/2025 205  ms Final    T Offset 04/10/2025 399  ms Final    QTC Fredericia 04/10/2025 395  ms Final    WBC 04/10/2025 4.8  4.4 - 11.3 x10*3/uL Final    nRBC 04/10/2025 0.0  0.0 - 0.0 /100 WBCs Final    RBC 04/10/2025 4.29  4.00 - 5.20 x10*6/uL Final    Hemoglobin 04/10/2025 13.0  12.0 - 16.0 g/dL Final    Hematocrit 04/10/2025 39.9  36.0 - 46.0 % Final    MCV 04/10/2025 93  80 - 100 fL Final    MCH 04/10/2025 30.3  26.0 - 34.0 pg Final     MCHC 04/10/2025 32.6  32.0 - 36.0 g/dL Final    RDW 04/10/2025 11.6  11.5 - 14.5 % Final    Platelets 04/10/2025 182  150 - 450 x10*3/uL Final    Neutrophils % 04/10/2025 63.9  40.0 - 80.0 % Final    Immature Granulocytes %, Automated 04/10/2025 0.4  0.0 - 0.9 % Final    Immature Granulocyte Count (IG) includes promyelocytes, myelocytes and metamyelocytes but does not include bands. Percent differential counts (%) should be interpreted in the context of the absolute cell counts (cells/UL).    Lymphocytes % 04/10/2025 28.3  13.0 - 44.0 % Final    Monocytes % 04/10/2025 6.6  2.0 - 10.0 % Final    Eosinophils % 04/10/2025 0.4  0.0 - 6.0 % Final    Basophils % 04/10/2025 0.4  0.0 - 2.0 % Final    Neutrophils Absolute 04/10/2025 3.09  1.20 - 7.70 x10*3/uL Final    Percent differential counts (%) should be interpreted in the context of the absolute cell counts (cells/uL).    Immature Granulocytes Absolute, Au* 04/10/2025 0.02  0.00 - 0.70 x10*3/uL Final    Lymphocytes Absolute 04/10/2025 1.37  1.20 - 4.80 x10*3/uL Final    Monocytes Absolute 04/10/2025 0.32  0.10 - 1.00 x10*3/uL Final    Eosinophils Absolute 04/10/2025 0.02  0.00 - 0.70 x10*3/uL Final    Basophils Absolute 04/10/2025 0.02  0.00 - 0.10 x10*3/uL Final    Magnesium 04/10/2025 2.06  1.60 - 2.40 mg/dL Final    Glucose 04/10/2025 93  74 - 99 mg/dL Final    Sodium 04/10/2025 142  136 - 145 mmol/L Final    Potassium 04/10/2025 4.5  3.5 - 5.3 mmol/L Final    Chloride 04/10/2025 106  98 - 107 mmol/L Final    Bicarbonate 04/10/2025 30  21 - 32 mmol/L Final    Anion Gap 04/10/2025 11  10 - 20 mmol/L Final    Urea Nitrogen 04/10/2025 11  6 - 23 mg/dL Final    Creatinine 04/10/2025 0.54  0.50 - 1.05 mg/dL Final    eGFR 04/10/2025 >90  >60 mL/min/1.73m*2 Final    Calculations of estimated GFR are performed using the 2021 CKD-EPI Study Refit equation without the race variable for the IDMS-Traceable creatinine  methods.  https://jasn.asnjournals.org/content/early/2021/09/22/ASN.0974005304    Calcium 04/10/2025 9.6  8.6 - 10.3 mg/dL Final    Albumin 04/10/2025 4.7  3.4 - 5.0 g/dL Final    Alkaline Phosphatase 04/10/2025 53  33 - 110 U/L Final    Total Protein 04/10/2025 7.9  6.4 - 8.2 g/dL Final    AST 04/10/2025 11  9 - 39 U/L Final    Bilirubin, Total 04/10/2025 0.4  0.0 - 1.2 mg/dL Final    ALT 04/10/2025 10  7 - 45 U/L Final    Patients treated with Sulfasalazine may generate falsely decreased results for ALT.    D-Dimer, Quantitative VTE Exclusion 04/10/2025 242  <=500 ng/mL FEU Final    HCG, Urine 04/10/2025 NEGATIVE  NEGATIVE Final    Troponin I, High Sensitivity 04/10/2025 <3  0 - 13 ng/L Final    Color, Urine 04/10/2025 Colorless (N)  Light-Yellow, Yellow, Dark-Yellow Final    Appearance, Urine 04/10/2025 Turbid (N)  Clear Final    Specific Gravity, Urine 04/10/2025 1.006  1.005 - 1.035 Final    pH, Urine 04/10/2025 8.0  5.0, 5.5, 6.0, 6.5, 7.0, 7.5, 8.0 Final    Protein, Urine 04/10/2025 NEGATIVE  NEGATIVE, 10 (TRACE), 20 (TRACE) mg/dL Final    Glucose, Urine 04/10/2025 Normal  Normal mg/dL Final    Blood, Urine 04/10/2025 0.2 (2+) (A)  NEGATIVE mg/dL Final    Ketones, Urine 04/10/2025 NEGATIVE  NEGATIVE mg/dL Final    Bilirubin, Urine 04/10/2025 NEGATIVE  NEGATIVE mg/dL Final    Urobilinogen, Urine 04/10/2025 Normal  Normal mg/dL Final    Nitrite, Urine 04/10/2025 NEGATIVE  NEGATIVE Final    Leukocyte Esterase, Urine 04/10/2025 500 Viviana/uL (A)  NEGATIVE Final    Extra Tube 04/10/2025 Hold for add-ons.   Final    Auto resulted.    Troponin I, High Sensitivity 04/10/2025 <3  0 - 13 ng/L Final    WBC, Urine 04/10/2025 21-50 (A)  1-5, NONE /HPF Final    RBC, Urine 04/10/2025 1-2  NONE, 1-2, 3-5 /HPF Final    Squamous Epithelial Cells, Urine 04/10/2025 1-9 (SPARSE)  Reference range not established. /HPF Final    Urine Culture 04/10/2025 <=10,000 CFU/mL Streptococcus agalactiae (Group B Streptococcus) (A)   Final     Comment: Streptococcus agalactiae (Group B Streptococcus, GBS) may be significant in pregnant individuals. Recovery from non-pregnant individuals likely represents an insignificant finding. Routine GBS prenatal screening should be ordered using test “Group B                            Streptococcus (GBS) Prenatal Screen, Culture” ([id number]).   Office Visit on 04/10/2025   Component Date Value Ref Range Status    Preg Test, Ur 04/10/2025 Negative  Negative Final    POC Fingerstick Blood Glucose 04/10/2025 101 (A)  70 - 100 mg/dl Final       No further follow up needed from EDPD Team.     If there are any other questions for the ED Post-Discharge Culture Follow Up Team, please contact [phone]. Fax: [phone].    Monique Armenta, HortenciaD